# Patient Record
Sex: FEMALE | Race: WHITE
[De-identification: names, ages, dates, MRNs, and addresses within clinical notes are randomized per-mention and may not be internally consistent; named-entity substitution may affect disease eponyms.]

---

## 2020-07-11 ENCOUNTER — HOSPITAL ENCOUNTER (EMERGENCY)
Dept: HOSPITAL 77 - KA.ED | Age: 25
Discharge: HOME | End: 2020-07-11
Payer: COMMERCIAL

## 2020-07-11 DIAGNOSIS — N39.0: Primary | ICD-10-CM

## 2020-07-11 DIAGNOSIS — Z88.1: ICD-10-CM

## 2020-07-11 NOTE — EDM.PDOC
ED HPI GENERAL MEDICAL PROBLEM





- General


Chief Complaint: Genitourinary Problem


Stated Complaint: UTI


Time Seen by Provider: 07/11/20 19:45


Source of Information: Reports: Patient


History Limitations: Reports: No Limitations





- History of Present Illness


INITIAL COMMENTS - FREE TEXT/NARRATIVE: 





24-year-old female presents with complaints of hematuria, dysuria and urinary 

frequency that has been going on since Thursday. She initially noticed hematuria

Thursday and began experiencing increasing dysuria and urinary frequency the 

following day. She is currently breast-feeding. She is from [place]. They're 

camping down near [place]. She's had urinary tract infections in the past. She's 

no cylindrical bit of cramping in her lower back. She denies any fever or 

chills. She did try cranberry juice yesterday without relief.


Onset: Gradual


Onset Date: 07/09/20


Duration: Day(s):, Getting Worse


Location: Reports: Back


Quality: Reports: Ache


Severity: Moderate


Improves with: Reports: None


Worsens with: Reports: None


Associated Symptoms: Denies: Fever/Chills, Nausea/Vomiting





- Related Data


                                    Allergies











Allergy/AdvReac Type Severity Reaction Status Date / Time


 


azithromycin Allergy  Diarrhea Verified 07/11/20 19:50





[From Zithromax Z-Jarrett]     


 


cefprozil [From Cefzil] Allergy  Cannot Verified 07/11/20 19:50





   Remember  











Home Meds: 


                                    Home Meds





. [No Known Home Meds]  07/11/20 [History]











ED ROS GENERAL





- Review of Systems


Review Of Systems: Comprehensive ROS is negative, except as noted in HPI.





ED EXAM, GI/ABD





- Physical Exam


Exam: See Below


Exam Limited By: No Limitations


General Appearance: Alert, No Apparent Distress, Obese


Ears: Hearing Grossly Normal


Nose: Normal Inspection


Throat/Mouth: Normal Voice, No Airway Compromise


Respiratory/Chest: No Respiratory Distress


Back Exam: Normal Inspection


Extremities: Normal Inspection


Neurological: Alert, Oriented, No Motor/Sensory Deficits


Psychiatric: Normal Affect, Normal Mood


Skin Exam: Warm, Dry, Intact


Lymphatic: No Adenopathy





Course





- Vital Signs


Last Recorded V/S: 





                                Last Vital Signs











Temp  98.5 F   07/11/20 19:50


 


Pulse  86   07/11/20 19:50


 


Resp  20   07/11/20 19:50


 


BP  166/94 H  07/11/20 19:50


 


Pulse Ox  100   07/11/20 19:50














- Orders/Labs/Meds


Orders: 





                               Active Orders 24 hr











 Category Date Time Status


 


 Amoxicillin [Amoxil] Med  07/11/20 20:30 Ordered





 2,000 mg PO Q12HR   








                                Medication Orders





Amoxicillin (Amoxil)  2,000 mg PO Q12HR MANJEET








Labs: 





                                Laboratory Tests











  07/11/20 Range/Units





  19:41 


 


Specimen Type  Urincc  


 


Urine Color  Light yellow  (YELLOW)  


 


Urine Appearance  Clear  (CLEAR)  


 


Urine pH  7.0  (5.0-9.0)  


 


Ur Specific Gravity  1.015  (1.005-1.030)  


 


Urine Protein  30 H  (NEGATIVE)  mg/dL


 


Urine Glucose (UA)  Negative  (NEGATIVE)  mg/dL


 


Urine Ketones  Negative  (NEGATIVE)  mg/dL


 


Urine Occult Blood  Moderate H  (NEGATIVE)  


 


Urine Nitrite  Negative  (NEGATIVE)  


 


Urine Bilirubin  Negative  (NEGATIVE)  


 


Urine Urobilinogen  0.2  (0.2-1.0)  E.U./dL


 


Ur Leukocyte Esterase  Moderate H  (NEGATIVE)  


 


Urine RBC  5-10 H  (0-5)  /HPF


 


Urine WBC  Packed  (0-5)  /HPF


 


Ur Epithelial Cells  Few  /LPF


 


Urine Bacteria  Few  (NONE TO FEW)  /HPF











Meds: 





Medications











Generic Name Dose Route Start Last Admin





  Trade Name Freq  PRN Reason Stop Dose Admin


 


Amoxicillin  2,000 mg  07/11/20 20:30 





  Amoxil  PO  





  Q12HR MANJEET  














Discontinued Medications














Generic Name Dose Route Start Last Admin





  Trade Name Freq  PRN Reason Stop Dose Admin


 


Ceftriaxone Sodium  1 gm  07/11/20 20:20 





  Rocephin  IM  07/11/20 20:21 





  ONETIME ONE  














Departure





- Departure


Time of Disposition: 20:32


Disposition: Home, Self-Care 01


Condition: Good


Clinical Impression: 


 UTI, Urinary tract infectious disease








- Discharge Information


Instructions:  Urinary Tract Infection, Adult, Easy-to-Read


Referrals: 


PCP,Not In Area [Primary Care Provider] - 


Forms:  ED Department Discharge





Sepsis Event Note (ED)





- Evaluation


Sepsis Screening Result: No Definite Risk





- Focused Exam


Vital Signs: 





                                   Vital Signs











  Temp Pulse Resp BP Pulse Ox


 


 07/11/20 19:50  98.5 F  86  20  166/94 H  100














- My Orders


Last 24 Hours: 





My Active Orders





07/11/20 20:30


Amoxicillin [Amoxil]   2,000 mg PO Q12HR 














- Assessment/Plan


Last 24 Hours: 





My Active Orders





07/11/20 20:30


Amoxicillin [Amoxil]   2,000 mg PO Q12HR 











Assessment:: 





1. UTI


2. Currently breast-feeding


Plan: 





1. Patient is complaining of a symptoms consistent with urinary tract infection 

this is confirmed by her UA. She is currently breast-feeding emboli to avoid 

antibiotics that would interact with breast-feeding. She is requesting 

amoxicillin. I discussed with amoxicillin is not usually the first line of 

treatment and has a high resistance rate. We'll give her an IM injection of 

Rocephin and then begin amoxicillin 500 mg twice a day. I asked that she follow-

up with her primary care early next week for follow-up with her urinary cultures

that will be taken and to make sure that this is appropriate antibiotic that is 

not shown resistance. I encourage her to continue to drink plenty of water and 

she may continue the use of the cranberry juice. She did state that she gets 

yeast infection typically when she does take amoxicillin and that she has 

something that she can take for her yeast infection at this occurs

## 2022-08-13 ENCOUNTER — HOSPITAL ENCOUNTER (EMERGENCY)
Dept: HOSPITAL 41 - JD.ED | Age: 27
LOS: 1 days | Discharge: HOME | End: 2022-08-14
Payer: COMMERCIAL

## 2022-08-13 DIAGNOSIS — E66.9: ICD-10-CM

## 2022-08-13 DIAGNOSIS — N39.0: Primary | ICD-10-CM

## 2022-08-13 DIAGNOSIS — Z88.1: ICD-10-CM
